# Patient Record
Sex: FEMALE | Race: BLACK OR AFRICAN AMERICAN | URBAN - METROPOLITAN AREA
[De-identification: names, ages, dates, MRNs, and addresses within clinical notes are randomized per-mention and may not be internally consistent; named-entity substitution may affect disease eponyms.]

---

## 2020-06-05 ENCOUNTER — LAB OUTSIDE AN ENCOUNTER (OUTPATIENT)
Dept: URBAN - METROPOLITAN AREA CLINIC 105 | Facility: CLINIC | Age: 40
End: 2020-06-05

## 2020-06-06 LAB
ALBUMIN: 3.6
ALKALINE PHOSPHATASE: 109
ALT (SGPT): 10
AST (SGOT): 15
BILIRUBIN, DIRECT: 0.04
BILIRUBIN, TOTAL: <0.2
PROTEIN, TOTAL: 7

## 2020-07-14 ENCOUNTER — OFFICE VISIT (OUTPATIENT)
Dept: URBAN - METROPOLITAN AREA CLINIC 17 | Facility: CLINIC | Age: 40
End: 2020-07-14

## 2020-08-06 ENCOUNTER — OFFICE VISIT (OUTPATIENT)
Dept: URBAN - METROPOLITAN AREA TELEHEALTH 2 | Facility: TELEHEALTH | Age: 40
End: 2020-08-06
Payer: COMMERCIAL

## 2020-08-06 DIAGNOSIS — K62.5 RECTAL BLEED: ICD-10-CM

## 2020-08-06 DIAGNOSIS — K57.32 DIVERTICULITIS LARGE INTESTINE: ICD-10-CM

## 2020-08-06 DIAGNOSIS — K57.30 COLON, DIVERTICULOSIS: ICD-10-CM

## 2020-08-06 DIAGNOSIS — K76.0 FATTY LIVER: ICD-10-CM

## 2020-08-06 DIAGNOSIS — R14.0 BLOATING: ICD-10-CM

## 2020-08-06 DIAGNOSIS — K57.92 DIVERTICULITIS: ICD-10-CM

## 2020-08-06 DIAGNOSIS — K59.09 CHRONIC CONSTIPATION: ICD-10-CM

## 2020-08-06 DIAGNOSIS — R91.1 LUNG NODULE: ICD-10-CM

## 2020-08-06 DIAGNOSIS — K60.2 ANAL FISSURE: ICD-10-CM

## 2020-08-06 PROCEDURE — 1036F TOBACCO NON-USER: CPT | Performed by: INTERNAL MEDICINE

## 2020-08-06 PROCEDURE — G8417 CALC BMI ABV UP PARAM F/U: HCPCS | Performed by: INTERNAL MEDICINE

## 2020-08-06 PROCEDURE — G9903 PT SCRN TBCO ID AS NON USER: HCPCS | Performed by: INTERNAL MEDICINE

## 2020-08-06 PROCEDURE — 99213 OFFICE O/P EST LOW 20 MIN: CPT | Performed by: INTERNAL MEDICINE

## 2020-08-06 PROCEDURE — G8427 DOCREV CUR MEDS BY ELIG CLIN: HCPCS | Performed by: INTERNAL MEDICINE

## 2020-08-06 NOTE — HPI-OTHER HISTORIES
9/2018 36 yo lady pt of Dr. Huber. She tried linzess 290 mcg daily samples, and says it worked. She c/o rectal bleeding with stools for a week and a half - she saw dR Hogan last week and had a rectal exam and told it was a fissure and to use wipes. She says blood is in tissue and mixed in stool. Stools are not hard and she is not constipated. He wants her to proceed with c-scope and then f/u with him for discussion about colectomy. She says his office would call in a cream for her. She does not want a rectal exam today.  3/14/19 She has not yet had her colonoscopy - insurance issues. SHe has no more rectal bleeding. Ran out of linzess - needs a refill today. 4/29/20 discussed and reviewed colonoscopy from 2019 "Ariane been swelling up, my stomach has been hard" for the past week. She feels a pinching sensation on her left side, her stomach gets hard and swells then "goes down again". She has had this before but it is now worsening. SHe is trying to loose weight, as she gets ready for breast reduction surgery next month at [facility]. BMs are irregular, sometimes "I cant get it out and sometimes it is watery". This has been ongoing for the last month. SHe has a baseline constipation and has taking miralax "I dont think its working for me anymore". She had been taking miralax daily since her last colonoscopy. No nausea or vomiting or fevers or chills. SHe is currently on albuterol, oxybutynin, citalopram (she is not taking) nortriptyline ( she is not taking).  5/19/20 Says she is feeling better. No more abdominal hardness or pain. Discussed KUB showing moderate fecal loading. SHe is taking miralax daily. Having 2 small BMs a day. Feels incompletely evacuated. She drinks 1L of water a day.  8/6/20 She is taking miralax and a fiber supplement bid. The fiber supplement has 3 g of fiber per serving. 'I go every morning and its a good one". She has seen Dr Hogan, says she had an anal fissure and has a f/u scheduled.  Says she was wiping too hard and she is now using anal wipes.

## 2020-12-03 ENCOUNTER — OFFICE VISIT (OUTPATIENT)
Dept: URBAN - METROPOLITAN AREA CLINIC 17 | Facility: CLINIC | Age: 40
End: 2020-12-03
Payer: COMMERCIAL

## 2020-12-03 VITALS
DIASTOLIC BLOOD PRESSURE: 88 MMHG | SYSTOLIC BLOOD PRESSURE: 125 MMHG | BODY MASS INDEX: 46.63 KG/M2 | HEIGHT: 61 IN | TEMPERATURE: 97.1 F | HEART RATE: 84 BPM | WEIGHT: 247 LBS

## 2020-12-03 DIAGNOSIS — K76.0 FATTY LIVER: ICD-10-CM

## 2020-12-03 DIAGNOSIS — K60.2 ANAL FISSURE: ICD-10-CM

## 2020-12-03 DIAGNOSIS — R14.0 BLOATING: ICD-10-CM

## 2020-12-03 DIAGNOSIS — R91.1 LUNG NODULE: ICD-10-CM

## 2020-12-03 DIAGNOSIS — Z80.0 FAMILY HISTORY OF COLON CANCER: ICD-10-CM

## 2020-12-03 DIAGNOSIS — K62.5 RECTAL BLEED: ICD-10-CM

## 2020-12-03 DIAGNOSIS — K57.30 COLON, DIVERTICULOSIS: ICD-10-CM

## 2020-12-03 DIAGNOSIS — K59.09 CHRONIC CONSTIPATION: ICD-10-CM

## 2020-12-03 DIAGNOSIS — K57.92 DIVERTICULITIS: ICD-10-CM

## 2020-12-03 PROCEDURE — 99213 OFFICE O/P EST LOW 20 MIN: CPT | Performed by: INTERNAL MEDICINE

## 2020-12-03 PROCEDURE — 1036F TOBACCO NON-USER: CPT | Performed by: INTERNAL MEDICINE

## 2020-12-03 PROCEDURE — G8427 DOCREV CUR MEDS BY ELIG CLIN: HCPCS | Performed by: INTERNAL MEDICINE

## 2020-12-03 PROCEDURE — G8482 FLU IMMUNIZE ORDER/ADMIN: HCPCS | Performed by: INTERNAL MEDICINE

## 2020-12-03 PROCEDURE — G8417 CALC BMI ABV UP PARAM F/U: HCPCS | Performed by: INTERNAL MEDICINE

## 2020-12-03 NOTE — HPI-OTHER HISTORIES
9/2018 36 yo lady pt of Dr. Huber. She tried linzess 290 mcg daily samples, and says it worked. She c/o rectal bleeding with stools for a week and a half - she saw dR Hogan last week and had a rectal exam and told it was a fissure and to use wipes. She says blood is in tissue and mixed in stool. Stools are not hard and she is not constipated. He wants her to proceed with c-scope and then f/u with him for discussion about colectomy. She says his office would call in a cream for her. She does not want a rectal exam today.  3/14/19 She has not yet had her colonoscopy - insurance issues. SHe has no more rectal bleeding. Ran out of linzess - needs a refill today. 4/29/20 discussed and reviewed colonoscopy from 2019 "Ariane been swelling up, my stomach has been hard" for the past week. She feels a pinching sensation on her left side, her stomach gets hard and swells then "goes down again". She has had this before but it is now worsening. SHe is trying to loose weight, as she gets ready for breast reduction surgery next month at [facility]. BMs are irregular, sometimes "I cant get it out and sometimes it is watery". This has been ongoing for the last month. SHe has a baseline constipation and has taking miralax "I dont think its working for me anymore". She had been taking miralax daily since her last colonoscopy. No nausea or vomiting or fevers or chills. SHe is currently on albuterol, oxybutynin, citalopram (she is not taking) nortriptyline ( she is not taking).  5/19/20 Says she is feeling better. No more abdominal hardness or pain. Discussed KUB showing moderate fecal loading. SHe is taking miralax daily. Having 2 small BMs a day. Feels incompletely evacuated. She drinks 1L of water a day.  8/6/20 She is taking miralax and a fiber supplement bid. The fiber supplement has 3 g of fiber per serving. 'I go every morning and its a good one". She has seen Dr Hogan, says she had an anal fissure and has a f/u scheduled.  Says she was wiping too hard and she is now using anal wipes.   12/3/20 Has seen Dr Hogan "he says Im not needing surgery. everything looks good". She takes a fiber supplement + miralax daily and has regular daily BMs.

## 2022-02-15 ENCOUNTER — LAB OUTSIDE AN ENCOUNTER (OUTPATIENT)
Dept: URBAN - METROPOLITAN AREA CLINIC 17 | Facility: CLINIC | Age: 42
End: 2022-02-15

## 2022-02-15 ENCOUNTER — WEB ENCOUNTER (OUTPATIENT)
Dept: URBAN - METROPOLITAN AREA CLINIC 17 | Facility: CLINIC | Age: 42
End: 2022-02-15

## 2022-02-15 ENCOUNTER — OFFICE VISIT (OUTPATIENT)
Dept: URBAN - METROPOLITAN AREA CLINIC 17 | Facility: CLINIC | Age: 42
End: 2022-02-15
Payer: COMMERCIAL

## 2022-02-15 VITALS
HEIGHT: 61 IN | HEART RATE: 93 BPM | SYSTOLIC BLOOD PRESSURE: 152 MMHG | DIASTOLIC BLOOD PRESSURE: 89 MMHG | BODY MASS INDEX: 47.2 KG/M2 | TEMPERATURE: 97.4 F | WEIGHT: 250 LBS

## 2022-02-15 DIAGNOSIS — K62.5 RECTAL BLEED: ICD-10-CM

## 2022-02-15 DIAGNOSIS — R91.1 LUNG NODULE: ICD-10-CM

## 2022-02-15 DIAGNOSIS — E66.01 MORBID OBESITY: ICD-10-CM

## 2022-02-15 DIAGNOSIS — K60.2 ANAL FISSURE: ICD-10-CM

## 2022-02-15 DIAGNOSIS — K59.09 CHRONIC CONSTIPATION: ICD-10-CM

## 2022-02-15 DIAGNOSIS — R14.0 BLOATING: ICD-10-CM

## 2022-02-15 DIAGNOSIS — K57.92 DIVERTICULITIS: ICD-10-CM

## 2022-02-15 DIAGNOSIS — Z80.0 FAMILY HISTORY OF COLON CANCER: ICD-10-CM

## 2022-02-15 DIAGNOSIS — K76.0 FATTY LIVER: ICD-10-CM

## 2022-02-15 DIAGNOSIS — K57.30 COLON, DIVERTICULOSIS: ICD-10-CM

## 2022-02-15 PROCEDURE — 99213 OFFICE O/P EST LOW 20 MIN: CPT | Performed by: INTERNAL MEDICINE

## 2022-02-15 RX ORDER — NORTRIPTYLINE HYDROCHLORIDE 25 MG/1
CAPSULE ORAL
Qty: 30 | Status: ACTIVE | COMMUNITY

## 2022-02-15 RX ORDER — UMECLIDINIUM 62.5 UG/1
AEROSOL, POWDER ORAL
Qty: 30 | Refills: 1 | Status: ACTIVE | COMMUNITY

## 2022-02-15 RX ORDER — ALBUTEROL SULFATE 108 UG/1
INHALANT RESPIRATORY (INHALATION)
Qty: 6 | Status: ACTIVE | COMMUNITY

## 2022-02-15 RX ORDER — SYRING-NEEDL,DISP,INSUL,0.3 ML 31GX15/64"
SYRINGE, EMPTY DISPOSABLE MISCELLANEOUS
Qty: 100 | Status: ACTIVE | COMMUNITY

## 2022-02-15 RX ORDER — EPINEPHRINE 0.3 MG/.3ML
TAKE 1 AUTO EVERY DAY BY INJECTION ROUTE AS NEEDED FOR 1 DAY INJECTION SUBCUTANEOUS
Qty: 2 | Refills: 0 | Status: ACTIVE | COMMUNITY

## 2022-02-15 NOTE — HPI-OTHER HISTORIES
9/2018 38 yo lady pt of Dr. Huber. She tried linzess 290 mcg daily samples, and says it worked. She c/o rectal bleeding with stools for a week and a half - she saw dR Hogan last week and had a rectal exam and told it was a fissure and to use wipes. She says blood is in tissue and mixed in stool. Stools are not hard and she is not constipated. He wants her to proceed with c-scope and then f/u with him for discussion about colectomy. She says his office would call in a cream for her. She does not want a rectal exam today.  3/14/19 She has not yet had her colonoscopy - insurance issues. SHe has no more rectal bleeding. Ran out of linzess - needs a refill today. 4/29/20 discussed and reviewed colonoscopy from 2019 "Ariane been swelling up, my stomach has been hard" for the past week. She feels a pinching sensation on her left side, her stomach gets hard and swells then "goes down again". She has had this before but it is now worsening. SHe is trying to loose weight, as she gets ready for breast reduction surgery next month at [facility]. BMs are irregular, sometimes "I cant get it out and sometimes it is watery". This has been ongoing for the last month. SHe has a baseline constipation and has taking miralax "I dont think its working for me anymore". She had been taking miralax daily since her last colonoscopy. No nausea or vomiting or fevers or chills. SHe is currently on albuterol, oxybutynin, citalopram (she is not taking) nortriptyline ( she is not taking).  5/19/20 Says she is feeling better. No more abdominal hardness or pain. Discussed KUB showing moderate fecal loading. SHe is taking miralax daily. Having 2 small BMs a day. Feels incompletely evacuated. She drinks 1L of water a day.  8/6/20 She is taking miralax and a fiber supplement bid. The fiber supplement has 3 g of fiber per serving. 'I go every morning and its a good one". She has seen Dr Hogan, says she had an anal fissure and has a f/u scheduled.  Says she was wiping too hard and she is now using anal wipes.   12/3/20 Has seen Dr Hogan "he says Im not needing surgery. everything looks good". She takes a fiber supplement + miralax daily and has regular daily BMs.   2/15/22 Here because "Ariane been swelling" Discussed colonoscopy results. Says she was in hospital 3/2021 with covid PNA and sepsis.  She has been having BMs 3 times a day.

## 2022-03-29 ENCOUNTER — OFFICE VISIT (OUTPATIENT)
Dept: URBAN - METROPOLITAN AREA CLINIC 17 | Facility: CLINIC | Age: 42
End: 2022-03-29

## 2022-03-29 RX ORDER — NORTRIPTYLINE HYDROCHLORIDE 25 MG/1
CAPSULE ORAL
Qty: 30 | Status: ACTIVE | COMMUNITY

## 2022-03-29 RX ORDER — UMECLIDINIUM 62.5 UG/1
AEROSOL, POWDER ORAL
Qty: 30 | Refills: 1 | Status: ACTIVE | COMMUNITY

## 2022-03-29 RX ORDER — SYRING-NEEDL,DISP,INSUL,0.3 ML 31GX15/64"
SYRINGE, EMPTY DISPOSABLE MISCELLANEOUS
Qty: 100 | Status: ACTIVE | COMMUNITY

## 2022-03-29 RX ORDER — ALBUTEROL SULFATE 108 UG/1
INHALANT RESPIRATORY (INHALATION)
Qty: 6 | Status: ACTIVE | COMMUNITY

## 2022-03-29 RX ORDER — EPINEPHRINE 0.3 MG/.3ML
TAKE 1 AUTO EVERY DAY BY INJECTION ROUTE AS NEEDED FOR 1 DAY INJECTION SUBCUTANEOUS
Qty: 2 | Refills: 0 | Status: ACTIVE | COMMUNITY

## 2022-05-18 LAB
ALBUMIN: 3.8
ALKALINE PHOSPHATASE: 80
ALT (SGPT): 15
AST (SGOT): 20
BILIRUBIN, DIRECT: <0.1
BILIRUBIN, TOTAL: 0.2
PROTEIN, TOTAL: 6.2

## 2022-06-02 ENCOUNTER — OFFICE VISIT (OUTPATIENT)
Dept: URBAN - METROPOLITAN AREA CLINIC 17 | Facility: CLINIC | Age: 42
End: 2022-06-02
Payer: COMMERCIAL

## 2022-06-02 DIAGNOSIS — R91.1 LUNG NODULE: ICD-10-CM

## 2022-06-02 DIAGNOSIS — E66.01 MORBID OBESITY: ICD-10-CM

## 2022-06-02 DIAGNOSIS — K76.0 FATTY LIVER: ICD-10-CM

## 2022-06-02 DIAGNOSIS — K57.92 DIVERTICULITIS: ICD-10-CM

## 2022-06-02 DIAGNOSIS — K62.5 RECTAL BLEED: ICD-10-CM

## 2022-06-02 DIAGNOSIS — K57.30 COLON, DIVERTICULOSIS: ICD-10-CM

## 2022-06-02 DIAGNOSIS — K59.09 CHRONIC CONSTIPATION: ICD-10-CM

## 2022-06-02 DIAGNOSIS — R14.0 BLOATING: ICD-10-CM

## 2022-06-02 DIAGNOSIS — K60.2 ANAL FISSURE: ICD-10-CM

## 2022-06-02 DIAGNOSIS — Z80.0 FAMILY HISTORY OF COLON CANCER: ICD-10-CM

## 2022-06-02 PROBLEM — 197321007: Status: ACTIVE | Noted: 2020-08-05

## 2022-06-02 PROBLEM — 12063002: Status: ACTIVE | Noted: 2020-08-05

## 2022-06-02 PROBLEM — 733657002: Status: ACTIVE | Noted: 2020-08-05

## 2022-06-02 PROBLEM — 238136002: Status: ACTIVE | Noted: 2022-02-15

## 2022-06-02 PROBLEM — 307496006: Status: ACTIVE | Noted: 2020-08-05

## 2022-06-02 PROBLEM — 786838002: Status: ACTIVE | Noted: 2020-08-05

## 2022-06-02 PROBLEM — 236069009: Status: ACTIVE | Noted: 2020-08-05

## 2022-06-02 PROBLEM — 116289008: Status: ACTIVE | Noted: 2020-08-05

## 2022-06-02 PROCEDURE — 99214 OFFICE O/P EST MOD 30 MIN: CPT | Performed by: INTERNAL MEDICINE

## 2022-06-02 RX ORDER — SYRING-NEEDL,DISP,INSUL,0.3 ML 31GX15/64"
SYRINGE, EMPTY DISPOSABLE MISCELLANEOUS
Qty: 100 | Status: ACTIVE | COMMUNITY

## 2022-06-02 RX ORDER — LINACLOTIDE 145 UG/1
1 CAPSULE AT LEAST 30 MINUTES BEFORE THE FIRST MEAL OF THE DAY ON AN EMPTY STOMACH CAPSULE, GELATIN COATED ORAL ONCE A DAY
Qty: 90 | Refills: 3 | OUTPATIENT
Start: 2022-06-02 | End: 2023-05-28

## 2022-06-02 RX ORDER — EPINEPHRINE 0.3 MG/.3ML
TAKE 1 AUTO EVERY DAY BY INJECTION ROUTE AS NEEDED FOR 1 DAY INJECTION SUBCUTANEOUS
Qty: 2 | Refills: 0 | Status: ACTIVE | COMMUNITY

## 2022-06-02 RX ORDER — NORTRIPTYLINE HYDROCHLORIDE 25 MG/1
CAPSULE ORAL
Qty: 30 | Status: ACTIVE | COMMUNITY

## 2022-06-02 RX ORDER — UMECLIDINIUM 62.5 UG/1
AEROSOL, POWDER ORAL
Qty: 30 | Refills: 1 | Status: ACTIVE | COMMUNITY

## 2022-06-02 RX ORDER — ALBUTEROL SULFATE 108 UG/1
INHALANT RESPIRATORY (INHALATION)
Qty: 6 | Status: ACTIVE | COMMUNITY

## 2022-06-02 NOTE — HPI-OTHER HISTORIES
9/2018 36 yo lady pt of Dr. Huber. She tried linzess 290 mcg daily samples, and says it worked. She c/o rectal bleeding with stools for a week and a half - she saw dR Hogan last week and had a rectal exam and told it was a fissure and to use wipes. She says blood is in tissue and mixed in stool. Stools are not hard and she is not constipated. He wants her to proceed with c-scope and then f/u with him for discussion about colectomy. She says his office would call in a cream for her. She does not want a rectal exam today.  3/14/19 She has not yet had her colonoscopy - insurance issues. SHe has no more rectal bleeding. Ran out of linzess - needs a refill today. 4/29/20 discussed and reviewed colonoscopy from 2019 "Ariane been swelling up, my stomach has been hard" for the past week. She feels a pinching sensation on her left side, her stomach gets hard and swells then "goes down again". She has had this before but it is now worsening. SHe is trying to loose weight, as she gets ready for breast reduction surgery next month at [facility]. BMs are irregular, sometimes "I cant get it out and sometimes it is watery". This has been ongoing for the last month. SHe has a baseline constipation and has taking miralax "I dont think its working for me anymore". She had been taking miralax daily since her last colonoscopy. No nausea or vomiting or fevers or chills. SHe is currently on albuterol, oxybutynin, citalopram (she is not taking) nortriptyline ( she is not taking).  5/19/20 Says she is feeling better. No more abdominal hardness or pain. Discussed KUB showing moderate fecal loading. SHe is taking miralax daily. Having 2 small BMs a day. Feels incompletely evacuated. She drinks 1L of water a day.  8/6/20 She is taking miralax and a fiber supplement bid. The fiber supplement has 3 g of fiber per serving. 'I go every morning and its a good one". She has seen Dr Hogan, says she had an anal fissure and has a f/u scheduled.  Says she was wiping too hard and she is now using anal wipes.   12/3/20 Has seen Dr Hogan "he says Im not needing surgery. everything looks good". She takes a fiber supplement + miralax daily and has regular daily BMs.   2/15/22 Here because "Ariane been swelling" Discussed colonoscopy results. Says she was in hospital 3/2021 with covid PNA and sepsis.  She has been having BMs 3 times a day.   6/2/22 here for f.u visit.  Says has been having BMs "but its not enough" Takes miralax and metamucil are not always productive and so she will take "magnesium to flush it". Spontaneous BMs are about once a week.  ADdendum: at end of visit kUB results noted and discussed with pt 2/2022 showing moderate fecal loading to the rectum similar to 5/2020

## 2022-07-26 ENCOUNTER — OFFICE VISIT (OUTPATIENT)
Dept: URBAN - METROPOLITAN AREA CLINIC 17 | Facility: CLINIC | Age: 42
End: 2022-07-26

## 2022-07-26 RX ORDER — SYRING-NEEDL,DISP,INSUL,0.3 ML 31GX15/64"
SYRINGE, EMPTY DISPOSABLE MISCELLANEOUS
Qty: 100 | Status: ACTIVE | COMMUNITY

## 2022-07-26 RX ORDER — ALBUTEROL SULFATE 108 UG/1
INHALANT RESPIRATORY (INHALATION)
Qty: 6 | Status: ACTIVE | COMMUNITY

## 2022-07-26 RX ORDER — EPINEPHRINE 0.3 MG/.3ML
TAKE 1 AUTO EVERY DAY BY INJECTION ROUTE AS NEEDED FOR 1 DAY INJECTION SUBCUTANEOUS
Qty: 2 | Refills: 0 | Status: ACTIVE | COMMUNITY

## 2022-07-26 RX ORDER — LINACLOTIDE 145 UG/1
1 CAPSULE AT LEAST 30 MINUTES BEFORE THE FIRST MEAL OF THE DAY ON AN EMPTY STOMACH CAPSULE, GELATIN COATED ORAL ONCE A DAY
Qty: 90 | Refills: 3 | Status: ACTIVE | COMMUNITY
Start: 2022-06-02 | End: 2023-05-28

## 2022-07-26 RX ORDER — NORTRIPTYLINE HYDROCHLORIDE 25 MG/1
CAPSULE ORAL
Qty: 30 | Status: ACTIVE | COMMUNITY

## 2022-07-26 RX ORDER — UMECLIDINIUM 62.5 UG/1
AEROSOL, POWDER ORAL
Qty: 30 | Refills: 1 | Status: ACTIVE | COMMUNITY

## 2022-10-03 ENCOUNTER — OFFICE VISIT (OUTPATIENT)
Dept: URBAN - METROPOLITAN AREA CLINIC 17 | Facility: CLINIC | Age: 42
End: 2022-10-03

## 2022-10-03 RX ORDER — NORTRIPTYLINE HYDROCHLORIDE 25 MG/1
CAPSULE ORAL
Qty: 30 | Status: ACTIVE | COMMUNITY

## 2022-10-03 RX ORDER — ALBUTEROL SULFATE 108 UG/1
INHALANT RESPIRATORY (INHALATION)
Qty: 6 | Status: ACTIVE | COMMUNITY

## 2022-10-03 RX ORDER — EPINEPHRINE 0.3 MG/.3ML
TAKE 1 AUTO EVERY DAY BY INJECTION ROUTE AS NEEDED FOR 1 DAY INJECTION SUBCUTANEOUS
Qty: 2 | Refills: 0 | Status: ACTIVE | COMMUNITY

## 2022-10-03 RX ORDER — LINACLOTIDE 145 UG/1
1 CAPSULE AT LEAST 30 MINUTES BEFORE THE FIRST MEAL OF THE DAY ON AN EMPTY STOMACH CAPSULE, GELATIN COATED ORAL ONCE A DAY
Qty: 90 | Refills: 3 | Status: ACTIVE | COMMUNITY
Start: 2022-06-02 | End: 2023-05-28

## 2022-10-03 RX ORDER — SYRING-NEEDL,DISP,INSUL,0.3 ML 31GX15/64"
SYRINGE, EMPTY DISPOSABLE MISCELLANEOUS
Qty: 100 | Status: ACTIVE | COMMUNITY

## 2022-10-03 RX ORDER — UMECLIDINIUM 62.5 UG/1
AEROSOL, POWDER ORAL
Qty: 30 | Refills: 1 | Status: ACTIVE | COMMUNITY

## 2023-03-06 ENCOUNTER — DASHBOARD ENCOUNTERS (OUTPATIENT)
Age: 43
End: 2023-03-06

## 2023-03-09 ENCOUNTER — OFFICE VISIT (OUTPATIENT)
Dept: URBAN - METROPOLITAN AREA CLINIC 17 | Facility: CLINIC | Age: 43
End: 2023-03-09

## 2023-03-09 VITALS — HEIGHT: 61 IN

## 2023-03-09 RX ORDER — NORTRIPTYLINE HYDROCHLORIDE 25 MG/1
CAPSULE ORAL
Qty: 30 | COMMUNITY

## 2023-03-09 RX ORDER — ALBUTEROL SULFATE 108 UG/1
INHALANT RESPIRATORY (INHALATION)
Qty: 6 | COMMUNITY

## 2023-03-09 RX ORDER — LINACLOTIDE 145 UG/1
1 CAPSULE AT LEAST 30 MINUTES BEFORE THE FIRST MEAL OF THE DAY ON AN EMPTY STOMACH CAPSULE, GELATIN COATED ORAL ONCE A DAY
Qty: 90 | Refills: 3 | COMMUNITY
Start: 2022-06-02 | End: 2023-05-28

## 2023-03-09 RX ORDER — SYRING-NEEDL,DISP,INSUL,0.3 ML 31GX15/64"
SYRINGE, EMPTY DISPOSABLE MISCELLANEOUS
Qty: 100 | COMMUNITY

## 2023-03-09 RX ORDER — UMECLIDINIUM 62.5 UG/1
AEROSOL, POWDER ORAL
Qty: 30 | Refills: 1 | COMMUNITY

## 2023-03-09 RX ORDER — EPINEPHRINE 0.3 MG/.3ML
TAKE 1 AUTO EVERY DAY BY INJECTION ROUTE AS NEEDED FOR 1 DAY INJECTION SUBCUTANEOUS
Qty: 2 | Refills: 0 | COMMUNITY